# Patient Record
Sex: FEMALE | Race: BLACK OR AFRICAN AMERICAN | NOT HISPANIC OR LATINO | URBAN - METROPOLITAN AREA
[De-identification: names, ages, dates, MRNs, and addresses within clinical notes are randomized per-mention and may not be internally consistent; named-entity substitution may affect disease eponyms.]

---

## 2018-04-22 ENCOUNTER — EMERGENCY (EMERGENCY)
Facility: HOSPITAL | Age: 83
LOS: 1 days | Discharge: HOME | End: 2018-04-22
Attending: EMERGENCY MEDICINE
Payer: COMMERCIAL

## 2018-04-22 VITALS
DIASTOLIC BLOOD PRESSURE: 95 MMHG | RESPIRATION RATE: 18 BRPM | SYSTOLIC BLOOD PRESSURE: 157 MMHG | HEART RATE: 88 BPM | OXYGEN SATURATION: 97 %

## 2018-04-22 VITALS
RESPIRATION RATE: 17 BRPM | TEMPERATURE: 98 F | OXYGEN SATURATION: 100 % | DIASTOLIC BLOOD PRESSURE: 91 MMHG | HEART RATE: 81 BPM | SYSTOLIC BLOOD PRESSURE: 152 MMHG

## 2018-04-22 DIAGNOSIS — Z98.890 OTHER SPECIFIED POSTPROCEDURAL STATES: Chronic | ICD-10-CM

## 2018-04-22 PROCEDURE — 99283 EMERGENCY DEPT VISIT LOW MDM: CPT

## 2018-04-22 RX ORDER — ASPIRIN/CALCIUM CARB/MAGNESIUM 324 MG
1 TABLET ORAL
Qty: 0 | Refills: 0 | COMMUNITY
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
## 2018-04-22 NOTE — ED PROVIDER NOTE - PHYSICAL EXAMINATION
no new focal or motor deficits   cn 2-12 grossly intact   well appearing  no c-spine tenderness  full ROM of neck, upper extremities, lower extremities  Ambulatory w/ assistance at baseline per patient  right lower extremity brace in place, chronic

## 2018-04-22 NOTE — ED ADULT NURSE NOTE - CHPI ED SYMPTOMS NEG
no back pain/no pain/no fussiness/no disorientation/no bruising/no neck tenderness/no laceration/no loss of consciousness/no sleeping issues/no dizziness/no headache/no crying/no decreased eating/drinking/no difficulty bearing weight

## 2025-01-10 NOTE — ED ADULT TRIAGE NOTE - NS ED NURSE DIRECT TO ROOM YN
Arrives d/t a fall occurring around 0300 tonight. Unable to explain how or why she fell, just states she did. Localizes pain to her L upper arm, denies any numbness or tingling, is unable to the make full ROM; CMS intact. Rates pain 8/10 denies taking and medication for her pain PTA. Additionally observed to have bruising to her R sided lateral orbital area. Denies further complaint. AxOx2;  states they had wine prior to going to sleep, 4-5 glasses.   Brief Postoperative Note      Patient: Cresencio Palm  YOB: 2014  MRN: [id number]    Date of Procedure: 12/8/2023    Pre-Op Diagnosis Codes:     * Phimosis [N47.1]     * Hematuria, unspecified type [R31.9]    Post-Op Diagnosis: Same       Procedure(s):  CIRCUMCISION - COMPLICATED    Surgeon(s):  Mary Alvarenga MD    Assistant:  Surgical Assistant: Amy Rollins    Anesthesia: General    Estimated Blood Loss (mL): Minimal    Complications: None    Specimens:   ID Type Source Tests Collected by Time Destination   1 : Foreskin Tissue Foreskin SURGICAL PATHOLOGY Mary Alvarenga MD 12/8/2023 1519        Implants:  * No implants in log *      Drains: * No LDAs found *    Findings: Severe inflammation consistent with BX O.  Very friable tissues. Distended biopsies consistent with ballooning. Dorsal hooded puppies variant with ventral tethering. Difficult dissection. Circumcision performed via 2 point fixation with partial degloving.       Electronically signed by Mary Alvarenga MD on 12/8/2023 at 4:13 PM Yes

## 2025-06-16 NOTE — ED PROVIDER NOTE - CPE EDP RESP NORM
Copied from CRM #[id number]. Topic: Medications - Medication Refill  >> Jun 16, 2025 10:04 AM Raegan wrote:  Type:  RX Refill Request    Who Called: Mom  Refill or New Rx:Refill  RX Name and Strength:cetirizine (ZYRTEC) 1 mg/mL syrup  How is the patient currently taking it? (ex. 1XDay):  Is this a 30 day or 90 day RX:  Preferred Pharmacy with phone number:       Local or Mail Order:Local  Ordering Provider:Marianela Horvath  Would the patient rather a call back or a response via [facility]? Callback  Best Call Back Number:[phone]  Additional Information: Need refill   normal...